# Patient Record
Sex: MALE | Race: BLACK OR AFRICAN AMERICAN | NOT HISPANIC OR LATINO | URBAN - METROPOLITAN AREA
[De-identification: names, ages, dates, MRNs, and addresses within clinical notes are randomized per-mention and may not be internally consistent; named-entity substitution may affect disease eponyms.]

---

## 2020-02-20 ENCOUNTER — EMERGENCY (EMERGENCY)
Facility: HOSPITAL | Age: 30
LOS: 1 days | Discharge: HOME | End: 2020-02-20
Attending: EMERGENCY MEDICINE | Admitting: EMERGENCY MEDICINE
Payer: COMMERCIAL

## 2020-02-20 VITALS
DIASTOLIC BLOOD PRESSURE: 74 MMHG | TEMPERATURE: 99 F | OXYGEN SATURATION: 100 % | SYSTOLIC BLOOD PRESSURE: 118 MMHG | RESPIRATION RATE: 18 BRPM | HEART RATE: 88 BPM

## 2020-02-20 PROCEDURE — 99284 EMERGENCY DEPT VISIT MOD MDM: CPT

## 2020-02-20 NOTE — ED ADULT TRIAGE NOTE - CHIEF COMPLAINT QUOTE
as per mother pt was c/o feeling stressed and having financial difficulties then collapsed and had shaking with his eyes rolled back. as per EMS witnessed similar episode, and had similar episode int triage, during the episode pt remained responsive alert to self, place and date. pt participate minimally in the interview, attempts to leave. mother denies hx of seizures in the past denies drug or ETOH use, pt self preoccupied,  answers to questions selectively. as per mother pt was c/o feeling stressed and having financial difficulties then collapsed and had shaking with his eyes rolled back. as per EMS witnessed similar episode, and had similar episode in triage, during the episode pt remained responsive, alert to self, place and date. pt participates minimally during the interview, attempts to leave. mother denies hx of seizures in the past denies drug or ETOH use, pt internally preoccupied,  answers to questions selectively, PERRLA extremities are strong and equal, repeatedly states it was not his will to be brought to ER. Pt with security escorted to  for inventory then to assigned bed, safety maintained.

## 2020-02-20 NOTE — ED ADULT NURSE NOTE - CHIEF COMPLAINT QUOTE
as per mother pt was c/o feeling stressed and having financial difficulties then collapsed and had shaking with his eyes rolled back. as per EMS witnessed similar episode, and had similar episode int triage, during the episode pt remained responsive alert to self, place and date. pt participate minimally in the interview, attempts to leave. mother denies hx of seizures in the past denies drug or ETOH use, pt self preoccupied,  answers to questions selectively.

## 2020-02-20 NOTE — ED ADULT NURSE NOTE - OBJECTIVE STATEMENT
Pt is a 29yr old male, A&Ox4 and ambulatory, brought in by family complaining of "seizure-like activity" today while expressing to his mother that he is overly stressed for financial and personal reasons. As per mother, pt was already lying down on the bed, denies hitting head or foaming at the mouth, +extremity muscle jerking, "for about 5 minutes". Reports not "responsive" during the episode. PMH of HTN. Upon assessment, pt refuses to speak, refuses assessment, and VS. +ROM. Equal  strength. Breathing even and unlabored. Denies SI/HI, drug or alcohol use. Pending MD parry. Family at the bedside. Will continue to monitor. Pt is a 29yr old male, A&Ox4 and ambulatory, brought in by family complaining of "seizure-like activity" today while expressing to his mother that he is overly stressed for financial and personal reasons. As per mother, pt was already lying down on the bed, denies hitting head or foaming at the mouth, +extremity muscle jerking, "for about 5 minutes". Reports not "responsive" during the episode. No hx of previous seizures. PMH of HTN. Upon assessment, pt refuses to speak, refuses assessment, and VS. +ROM. Equal  strength. Breathing even and unlabored. Denies SI/HI, drug or alcohol use. Pending MD parry. Family at the bedside. Will continue to monitor.

## 2020-02-21 VITALS
OXYGEN SATURATION: 100 % | HEART RATE: 90 BPM | DIASTOLIC BLOOD PRESSURE: 75 MMHG | SYSTOLIC BLOOD PRESSURE: 122 MMHG | RESPIRATION RATE: 18 BRPM

## 2020-02-21 LAB
ALBUMIN SERPL ELPH-MCNC: 4.1 G/DL — SIGNIFICANT CHANGE UP (ref 3.3–5)
ALP SERPL-CCNC: 52 U/L — SIGNIFICANT CHANGE UP (ref 40–120)
ALT FLD-CCNC: 45 U/L — HIGH (ref 4–41)
ANION GAP SERPL CALC-SCNC: 14 MMO/L — SIGNIFICANT CHANGE UP (ref 7–14)
APAP SERPL-MCNC: < 15 UG/ML — LOW (ref 15–25)
AST SERPL-CCNC: 29 U/L — SIGNIFICANT CHANGE UP (ref 4–40)
BASOPHILS # BLD AUTO: 0.02 K/UL — SIGNIFICANT CHANGE UP (ref 0–0.2)
BASOPHILS NFR BLD AUTO: 0.3 % — SIGNIFICANT CHANGE UP (ref 0–2)
BILIRUB SERPL-MCNC: 0.3 MG/DL — SIGNIFICANT CHANGE UP (ref 0.2–1.2)
BUN SERPL-MCNC: 11 MG/DL — SIGNIFICANT CHANGE UP (ref 7–23)
CALCIUM SERPL-MCNC: 9.3 MG/DL — SIGNIFICANT CHANGE UP (ref 8.4–10.5)
CHLORIDE SERPL-SCNC: 99 MMOL/L — SIGNIFICANT CHANGE UP (ref 98–107)
CO2 SERPL-SCNC: 25 MMOL/L — SIGNIFICANT CHANGE UP (ref 22–31)
CREAT SERPL-MCNC: 0.87 MG/DL — SIGNIFICANT CHANGE UP (ref 0.5–1.3)
EOSINOPHIL # BLD AUTO: 0.07 K/UL — SIGNIFICANT CHANGE UP (ref 0–0.5)
EOSINOPHIL NFR BLD AUTO: 1 % — SIGNIFICANT CHANGE UP (ref 0–6)
GLUCOSE SERPL-MCNC: 97 MG/DL — SIGNIFICANT CHANGE UP (ref 70–99)
HCT VFR BLD CALC: 48.1 % — SIGNIFICANT CHANGE UP (ref 39–50)
HGB BLD-MCNC: 15 G/DL — SIGNIFICANT CHANGE UP (ref 13–17)
IMM GRANULOCYTES NFR BLD AUTO: 0.1 % — SIGNIFICANT CHANGE UP (ref 0–1.5)
LYMPHOCYTES # BLD AUTO: 1.67 K/UL — SIGNIFICANT CHANGE UP (ref 1–3.3)
LYMPHOCYTES # BLD AUTO: 24.3 % — SIGNIFICANT CHANGE UP (ref 13–44)
MCHC RBC-ENTMCNC: 27.9 PG — SIGNIFICANT CHANGE UP (ref 27–34)
MCHC RBC-ENTMCNC: 31.2 % — LOW (ref 32–36)
MCV RBC AUTO: 89.4 FL — SIGNIFICANT CHANGE UP (ref 80–100)
MONOCYTES # BLD AUTO: 0.48 K/UL — SIGNIFICANT CHANGE UP (ref 0–0.9)
MONOCYTES NFR BLD AUTO: 7 % — SIGNIFICANT CHANGE UP (ref 2–14)
NEUTROPHILS # BLD AUTO: 4.61 K/UL — SIGNIFICANT CHANGE UP (ref 1.8–7.4)
NEUTROPHILS NFR BLD AUTO: 67.3 % — SIGNIFICANT CHANGE UP (ref 43–77)
NRBC # FLD: 0 K/UL — SIGNIFICANT CHANGE UP (ref 0–0)
PLATELET # BLD AUTO: 194 K/UL — SIGNIFICANT CHANGE UP (ref 150–400)
PMV BLD: 12.1 FL — SIGNIFICANT CHANGE UP (ref 7–13)
POTASSIUM SERPL-MCNC: 3.3 MMOL/L — LOW (ref 3.5–5.3)
POTASSIUM SERPL-SCNC: 3.3 MMOL/L — LOW (ref 3.5–5.3)
PROT SERPL-MCNC: 7.5 G/DL — SIGNIFICANT CHANGE UP (ref 6–8.3)
RBC # BLD: 5.38 M/UL — SIGNIFICANT CHANGE UP (ref 4.2–5.8)
RBC # FLD: 13.5 % — SIGNIFICANT CHANGE UP (ref 10.3–14.5)
SALICYLATES SERPL-MCNC: < 5 MG/DL — LOW (ref 15–30)
SODIUM SERPL-SCNC: 138 MMOL/L — SIGNIFICANT CHANGE UP (ref 135–145)
TSH SERPL-MCNC: 2.33 UIU/ML — SIGNIFICANT CHANGE UP (ref 0.27–4.2)
WBC # BLD: 6.86 K/UL — SIGNIFICANT CHANGE UP (ref 3.8–10.5)
WBC # FLD AUTO: 6.86 K/UL — SIGNIFICANT CHANGE UP (ref 3.8–10.5)

## 2020-02-21 PROCEDURE — 70450 CT HEAD/BRAIN W/O DYE: CPT | Mod: 26

## 2020-02-21 NOTE — ED PROVIDER NOTE - OBJECTIVE STATEMENT
30yo M hx htn BIB mother for 15 minute episode of generalized shaking and eyes rolling back after got emotional and crying d/t financial difficulties. Was immediately back to baseline afterwards. No urinary/bowel incontinence or tongue biting. No personal or family hx of seizures. Thinks a similar episode happened in the past. Pt withdrawn and not talking in room. Mother notes this is normal for him as he has a very nervous disposition and is not different. Has not been seen for this by a doctor.

## 2020-02-21 NOTE — ED PROVIDER NOTE - PHYSICAL EXAMINATION
Gen: Well appearing, NAD, following commands  Head: NCAT  HEENT: PERRL, MMM, normal conjunctiva, anicteric, neck supple  Lung: CTAB, no adventitious sounds  CV: RRR, no murmurs  Abd: soft, NTND, no rebound or guarding, no CVAT  MSK: No edema, no visible deformities  Neuro: CN II-XII grossly intact. 5/5 strength and normal sensation in all extremities. Ambulatory with stable gait. alternating unilateral slow shaking movements of b/l UE  Skin: Warm and dry, no evidence of rash

## 2020-02-21 NOTE — ED PROVIDER NOTE - PATIENT PORTAL LINK FT
You can access the FollowMyHealth Patient Portal offered by [facility] by registering at the following website: [email or web address]. By joining [employer]’s FollowMyHealth portal, you will also be able to view your health information using other applications (apps) compatible with our system.

## 2020-02-21 NOTE — ED PROVIDER NOTE - ATTENDING CONTRIBUTION TO CARE
Seen and exaimned Seen and examined, witnessed shaking episode, unresponsive and non-verbal for 15 min, then mother states awoke with no confusion/lethargy, denies tongue biting or incontinence. Pt. states may have passed out in the past, also during stressful period. Denies prev. shaking or concern for seizure. Pt. with 2nd episode in ED, witnessed, alternating movements, shaking x 3min., spont. resolved in ED, pt. then kept eyes closed but obeyed commands and was non-verbal for another 10 min. Pt. then was able to move all ext., ambulate in ED with no c/o. Likely non-epileptic activity, awaits EKG and head CT in ED. MELBA, EOMI, neck supple, clear lungs, heart reg, abd soft, NT to palp, no edema, NT calves.

## 2020-02-21 NOTE — ED PROVIDER NOTE - CLINICAL SUMMARY MEDICAL DECISION MAKING FREE TEXT BOX
Poss seizure like episode. No postictal phase. Withdrawn appearing but mom notes is normal for him. Nonspecific tremor but neuro exam otherwise reassuring.

## 2020-02-21 NOTE — ED PROVIDER NOTE - NS ED ROS FT
PER MOM    CONST: no fevers, no chills  EYES: no pain, no vision changes  ENT: no sore throat, no ear pain, no change in hearing  CV: no chest pain, no leg swelling  RESP: no shortness of breath, no cough  ABD: no abdominal pain, no nausea, no vomiting, no diarrhea  : no dysuria, no flank pain, no hematuria  MSK: no back pain, no extremity pain  NEURO: no headache or additional neurologic complaints  HEME: no easy bleeding  SKIN:  no rash